# Patient Record
Sex: FEMALE | Race: BLACK OR AFRICAN AMERICAN | NOT HISPANIC OR LATINO | Employment: FULL TIME | URBAN - METROPOLITAN AREA
[De-identification: names, ages, dates, MRNs, and addresses within clinical notes are randomized per-mention and may not be internally consistent; named-entity substitution may affect disease eponyms.]

---

## 2022-10-21 ENCOUNTER — HOSPITAL ENCOUNTER (EMERGENCY)
Facility: CLINIC | Age: 52
Discharge: HOME | End: 2022-10-22
Attending: EMERGENCY MEDICINE | Admitting: EMERGENCY MEDICINE

## 2022-10-21 ENCOUNTER — APPOINTMENT (OUTPATIENT)
Dept: MRI IMAGING | Facility: CLINIC | Age: 52
End: 2022-10-21
Attending: EMERGENCY MEDICINE

## 2022-10-21 DIAGNOSIS — R90.89 ABNORMAL BRAIN MRI: ICD-10-CM

## 2022-10-21 DIAGNOSIS — R29.810 FACIAL DROOP: ICD-10-CM

## 2022-10-21 DIAGNOSIS — G51.0 BELL'S PALSY: ICD-10-CM

## 2022-10-21 LAB
ANION GAP SERPL CALCULATED.3IONS-SCNC: 10 MMOL/L (ref 5–18)
BASOPHILS # BLD AUTO: 0.1 10E3/UL (ref 0–0.2)
BASOPHILS NFR BLD AUTO: 1 %
BUN SERPL-MCNC: 10 MG/DL (ref 8–22)
CALCIUM SERPL-MCNC: 9.2 MG/DL (ref 8.5–10.5)
CHLORIDE BLD-SCNC: 108 MMOL/L (ref 98–107)
CO2 SERPL-SCNC: 22 MMOL/L (ref 22–31)
CREAT SERPL-MCNC: 0.69 MG/DL (ref 0.6–1.1)
EOSINOPHIL # BLD AUTO: 0.1 10E3/UL (ref 0–0.7)
EOSINOPHIL NFR BLD AUTO: 1 %
ERYTHROCYTE [DISTWIDTH] IN BLOOD BY AUTOMATED COUNT: 12.7 % (ref 10–15)
GFR SERPL CREATININE-BSD FRML MDRD: >90 ML/MIN/1.73M2
GLUCOSE BLD-MCNC: 94 MG/DL (ref 70–125)
HCT VFR BLD AUTO: 41.2 % (ref 35–47)
HGB BLD-MCNC: 13.6 G/DL (ref 11.7–15.7)
IMM GRANULOCYTES # BLD: 0 10E3/UL
IMM GRANULOCYTES NFR BLD: 0 %
LYMPHOCYTES # BLD AUTO: 2.5 10E3/UL (ref 0.8–5.3)
LYMPHOCYTES NFR BLD AUTO: 31 %
MCH RBC QN AUTO: 29.5 PG (ref 26.5–33)
MCHC RBC AUTO-ENTMCNC: 33 G/DL (ref 31.5–36.5)
MCV RBC AUTO: 89 FL (ref 78–100)
MONOCYTES # BLD AUTO: 0.6 10E3/UL (ref 0–1.3)
MONOCYTES NFR BLD AUTO: 7 %
NEUTROPHILS # BLD AUTO: 4.7 10E3/UL (ref 1.6–8.3)
NEUTROPHILS NFR BLD AUTO: 60 %
NRBC # BLD AUTO: 0 10E3/UL
NRBC BLD AUTO-RTO: 0 /100
PLATELET # BLD AUTO: 342 10E3/UL (ref 150–450)
POTASSIUM BLD-SCNC: 4 MMOL/L (ref 3.5–5)
RBC # BLD AUTO: 4.61 10E6/UL (ref 3.8–5.2)
SODIUM SERPL-SCNC: 140 MMOL/L (ref 136–145)
WBC # BLD AUTO: 7.9 10E3/UL (ref 4–11)

## 2022-10-21 PROCEDURE — 255N000002 HC RX 255 OP 636: Performed by: EMERGENCY MEDICINE

## 2022-10-21 PROCEDURE — 250N000011 HC RX IP 250 OP 636: Performed by: EMERGENCY MEDICINE

## 2022-10-21 PROCEDURE — 36415 COLL VENOUS BLD VENIPUNCTURE: CPT | Performed by: EMERGENCY MEDICINE

## 2022-10-21 PROCEDURE — 99285 EMERGENCY DEPT VISIT HI MDM: CPT | Mod: 25

## 2022-10-21 PROCEDURE — 96374 THER/PROPH/DIAG INJ IV PUSH: CPT | Mod: 59

## 2022-10-21 PROCEDURE — A9585 GADOBUTROL INJECTION: HCPCS | Performed by: EMERGENCY MEDICINE

## 2022-10-21 PROCEDURE — 85004 AUTOMATED DIFF WBC COUNT: CPT | Performed by: EMERGENCY MEDICINE

## 2022-10-21 PROCEDURE — 70553 MRI BRAIN STEM W/O & W/DYE: CPT

## 2022-10-21 PROCEDURE — 82435 ASSAY OF BLOOD CHLORIDE: CPT | Performed by: EMERGENCY MEDICINE

## 2022-10-21 RX ORDER — KETOROLAC TROMETHAMINE 15 MG/ML
15 INJECTION, SOLUTION INTRAMUSCULAR; INTRAVENOUS ONCE
Status: COMPLETED | OUTPATIENT
Start: 2022-10-21 | End: 2022-10-21

## 2022-10-21 RX ORDER — GADOBUTROL 604.72 MG/ML
7 INJECTION INTRAVENOUS ONCE
Status: COMPLETED | OUTPATIENT
Start: 2022-10-21 | End: 2022-10-21

## 2022-10-21 RX ADMIN — KETOROLAC TROMETHAMINE 15 MG: 15 INJECTION, SOLUTION INTRAMUSCULAR; INTRAVENOUS at 22:32

## 2022-10-21 RX ADMIN — GADOBUTROL 7 ML: 604.72 INJECTION INTRAVENOUS at 23:45

## 2022-10-21 ASSESSMENT — ACTIVITIES OF DAILY LIVING (ADL)
ADLS_ACUITY_SCORE: 35
ADLS_ACUITY_SCORE: 35

## 2022-10-22 VITALS
TEMPERATURE: 97.8 F | SYSTOLIC BLOOD PRESSURE: 161 MMHG | RESPIRATION RATE: 16 BRPM | OXYGEN SATURATION: 96 % | HEART RATE: 66 BPM | DIASTOLIC BLOOD PRESSURE: 85 MMHG

## 2022-10-22 RX ORDER — VALACYCLOVIR HYDROCHLORIDE 1 G/1
1000 TABLET, FILM COATED ORAL 3 TIMES DAILY
Qty: 21 TABLET | Refills: 0 | Status: SHIPPED | OUTPATIENT
Start: 2022-10-22 | End: 2022-10-29

## 2022-10-22 RX ORDER — PREDNISONE 20 MG/1
TABLET ORAL
Qty: 10 TABLET | Refills: 0 | Status: SHIPPED | OUTPATIENT
Start: 2022-10-22

## 2022-10-22 ASSESSMENT — ACTIVITIES OF DAILY LIVING (ADL): ADLS_ACUITY_SCORE: 35

## 2022-10-22 NOTE — ED TRIAGE NOTES
Triage Assessment     Row Name 10/21/22 2104       Triage Assessment (Adult)    Airway WDL WDL       Respiratory WDL    Respiratory WDL WDL       Skin Circulation/Temperature WDL    Skin Circulation/Temperature WDL WDL       Cardiac WDL    Cardiac WDL WDL       Peripheral/Neurovascular WDL    Peripheral Neurovascular WDL WDL       Cognitive/Neuro/Behavioral WDL    Cognitive/Neuro/Behavioral WDL WDL

## 2022-10-22 NOTE — ED PROVIDER NOTES
EMERGENCY DEPARTMENT ENCOUNTER      NAME: Alicia Rodriguez  AGE: 52 year old female  YOB: 1970  MRN: [id number]  EVALUATION DATE & TIME: 10/21/2022  9:03 PM    PCP: No primary care provider on file.    ED PROVIDER: Remy Cornell M.D.      Chief Complaint   Patient presents with     Facial Droop     Otalgia     Headache       FINAL IMPRESSION:  1. Facial droop    2. Bell's palsy    3. Abnormal brain MRI        ED COURSE & MEDICAL DECISION MAKIN year old female presents to the Emergency Department for evaluation of right-sided facial droop earache and headache.  She is hypertensive but otherwise vitally stable when she arrives to the emergency department.  She reports pain in the right occiput, otalgia, and today noticed a right-sided facial droop.  I evaluated the patient quickly in triage, she has incomplete right lower facial droop, minimal if any asymmetry of her right forehead however.  Otoscopic exam is unrevealing.  The remainder of her neurological exam is full and nonfocal except for reported subjective paresthesias of her upper extremities which have been ongoing and documented for some time.  I can see on chart review that she has had other visits for what was described as migraine headaches but has been sometime.  I am thinking at this point that her exam probably represents a partial facial paralysis from Bell's palsy however given the relative sparing of the forehead, did elect to obtain a brain MRI with and without contrast to exclude stroke.  This was negative for infarct.  There were a couple of abnormal findings that I did review with the patient.  The nonspecific T2/FLAIR signal intensities appear to be stable going back to previous brain imaging from 2018.  There is a new enhancing lesion on the posterior falx, probable meningioma, which the patient was updated about and will require surveillance but unlikely to be relating to today's presentation.  Finally there was  tortuous bilateral optic nerves and fluid-filled optic nerve sheaths.  Patient does not have any vision changes and has a relatively isolated and point intermittent headaches that I do not think are especially suspicious for intracranial hypertension.  No visible papilledema today however nondilated eye exam would be diagnostic.  Discussed everything with the patient and her family member, they declined an Czech  which was offered.  For now we will treat her as a partial Bell's palsy with antivirals and a short course of steroids.  I do think urgent neurology referral is warranted to discuss the other MRI findings and review her exam soon as possible and this was placed for them.  We discussed plan for discharge home and they were in agreement.  We reviewed return precautions for any new acute neurological deficit, vision change, intractable headache, or other immediate concern.    At the conclusion of the encounter I discussed the results of all of the tests and the disposition. The questions were answered. The patient or family acknowledged understanding and was agreeable with the care plan.       MEDICATIONS GIVEN IN THE EMERGENCY:  Medications   ketorolac (TORADOL) injection 15 mg (15 mg Intravenous Given 10/21/22 2232)   gadobutrol (GADAVIST) injection 7 mL (7 mLs Intravenous Given 10/21/22 2345)       NEW PRESCRIPTIONS STARTED AT TODAY'S ER VISIT  New Prescriptions    PREDNISONE (DELTASONE) 20 MG TABLET    Take two tablets (= 40mg) each day for 5 (five) days    VALACYCLOVIR (VALTREX) 1000 MG TABLET    Take 1 tablet (1,000 mg) by mouth 3 times daily for 7 days          =================================================================    HPI    Patient information was obtained from: Patient     Use of : N/A        Alicia Rodriguez is a 52 year old female with no contributory history who presents to this ED via private vehicle for evaluation of facial droop and headache.    Per chart review,  patient was seen at  [facility] earlier today on 10/21/22 for ear pain, headache, neck pain, and facial droop. Patient was diagnosed with an external ear infection and directed to go to the ED for further evaluation.    Patient reports right sided facial droop that she noticed around 10 AM today. Additionally, she endorses right sided ear pain, right sided neck pain, and a persisting headache. Patient notes that she has numbness in both of her hands at baseline. Patient denies loss of hearing or additional symptoms or complaints at this time.       REVIEW OF SYSTEMS   All systems reviewed and negative except as noted in HPI.    PAST MEDICAL HISTORY:  History reviewed. No pertinent past medical history.    PAST SURGICAL HISTORY:  History reviewed. No pertinent surgical history.        CURRENT MEDICATIONS:    No current facility-administered medications for this encounter.     Current Outpatient Medications   Medication     predniSONE (DELTASONE) 20 MG tablet     valACYclovir (VALTREX) 1000 mg tablet         ALLERGIES:  No Known Allergies    FAMILY HISTORY:  History reviewed. No pertinent family history.    SOCIAL HISTORY:   Social History     Socioeconomic History     Marital status:        VITALS:  BP (!) 151/85   Pulse 62   Temp 97.8  F (36.6  C) (Temporal)   Resp 16   LMP  (LMP Unknown)   SpO2 97%     PHYSICAL EXAM    Constitutional: Well developed, Well nourished, NAD.  HENT: Normocephalic, Atraumatic. Neck Supple.  Eyes: EOMI, Conjunctiva normal. Limited non dilated fundoscopic exam unremarkable.  Respiratory: Breathing comfortably on room air. Speaks full sentences easily. Lungs clear to ascultation.  Cardiovascular: Normal heart rate, Regular rhythm. No peripheral edema.  Abdomen: Soft  Musculoskeletal: Good range of motion in all major joints. No major deformities noted.  Integument: Warm, Dry.  Neurologic: Fully awake, alert, oriented.  There is incomplete paralysis of the right lower  face although the right forehead has minimal if any asymmetry..  Speech is normal.  Cranial nerves II through XII otherwise intact.  Strength is 5 out of 5 throughout bilateral upper and lower extremities.  Sensation to light touch intact x4.  Patient is ambulatory.  Psychiatric: Cooperative. Affect appropriate.     LAB:  All pertinent labs reviewed and interpreted.  Labs Ordered and Resulted from Time of ED Arrival to Time of ED Departure   BASIC METABOLIC PANEL - Abnormal       Result Value    Sodium 140      Potassium 4.0      Chloride 108 (*)     Carbon Dioxide (CO2) 22      Anion Gap 10      Urea Nitrogen 10      Creatinine 0.69      Calcium 9.2      Glucose 94      GFR Estimate >90     CBC WITH PLATELETS AND DIFFERENTIAL    WBC Count 7.9      RBC Count 4.61      Hemoglobin 13.6      Hematocrit 41.2      MCV 89      MCH 29.5      MCHC 33.0      RDW 12.7      Platelet Count 342      % Neutrophils 60      % Lymphocytes 31      % Monocytes 7      % Eosinophils 1      % Basophils 1      % Immature Granulocytes 0      NRBCs per 100 WBC 0      Absolute Neutrophils 4.7      Absolute Lymphocytes 2.5      Absolute Monocytes 0.6      Absolute Eosinophils 0.1      Absolute Basophils 0.1      Absolute Immature Granulocytes 0.0      Absolute NRBCs 0.0         RADIOLOGY:  Reviewed all pertinent imaging. Please see official radiology report.  MR Brain w/o & w Contrast   Preliminary Result   IMPRESSION:   1.  No acute or subacute infarct identified.   2.  Small enhancing lesion along the left aspect of the posterior falx measuring up to 4 mm in greatest dimensions. There is no significant associated mass effect or vasogenic edema. This finding likely reflects a meningioma. Continued interval follow-up    imaging is recommended in 3-6 month.   3.  There are scattered foci of increased T2/FLAIR signal hyperintensity involving the periventricular, subcortical and deep white matter of the supratentorial brain, nonspecific. These  findings are somewhat advanced for patient age and may reflect    chronic microvascular ischemic change, chronic migraine effect, a demyelinating process (although no enhancing lesions are identified to suggest active demyelination), or even Lyme disease, among other etiologies.   4.  Prominent Meckel's cave with tortuous bilateral optic nerves with prominent fluid-filled optic nerve sheaths. These findings have been reported in the setting of idiopathic intracranial hypertension. No definite other stigmata of intracranial    hypertension are identified. Correlation for papilledema and chronic headaches is recommended.            I, Terry Arguello, am serving as a scribe to document services personally performed by Dr. Remy Cornell, based on my observation and the provider's statements to me. I, Remy Cornell MD attest that Terry Arguello is acting in a scribe capacity, has observed my performance of the services and has documented them in accordance with my direction.    Remy Cornell M.D.  Emergency Medicine  [facility] EMERGENCY ROOM  [address] [id number] [phone]  Dept: [phone]     Remy Cornell MD  10/22/22 0207

## 2022-10-22 NOTE — DISCHARGE INSTRUCTIONS
You were seen in the emergency department at [facility] for right-sided facial droop and headache.  We think your symptoms may be consistent with a process called Bell's palsy which is a problem with the facial nerve on the right side causing paralysis of half of your face.  The usual treatment for this includes antiviral medications and steroids to complete a short course.  Since you are also having a headache and your exam seems a little bit abnormal, we did obtain a brain MRI.  There were some chronic stable findings from previous however there were a couple of things that we would like you to review with a neurologist.  1 of these was optic nerve thickening, and the other was a small incidental benign growth that we think is probably a meningioma.  There is a good chance they will want to repeat some brain imaging in about 3 to 6 months to follow this up.  We placed a neurology referral and a  should be reaching out to you to help make an appointment.  If you do not hear anything you can also call the clinic next week using the information above.  If you have any other immediate concerns like new neurological deficits, vision changes, weakness or inability to walk, we need to reevaluate you right away in the emergency department.

## 2022-10-22 NOTE — ED TRIAGE NOTES
Pt arrives to ED with c/o right facial droop she noticed today around 1000 this morning. Pt also has right ear pain, right neck pain, and a headache. Pt always has numbness on both hands which is her norm. Provider at bedside to stroke assess.

## 2022-12-15 NOTE — TELEPHONE ENCOUNTER
RECORDS RECEIVED FROM: internal   REASON FOR VISIT: facial droop   Date of Appt: 3/14/23   NOTES (FOR ALL VISITS) STATUS DETAILS   OFFICE NOTE from referring provider Internal SEE INPATIENT NOTES   DISCHAR[facility] the [facility]:  10/21/22-10/22/22   MEDICATION LIST Internal    IMAGING  (FOR ALL VISITS)     MRI (HEAD, NECK, SPINE) Internal [facility]:  MRI Brain 10/21/22

## 2023-03-13 ENCOUNTER — TELEPHONE (OUTPATIENT)
Dept: NEUROLOGY | Facility: CLINIC | Age: 53
End: 2023-03-13

## 2023-03-13 NOTE — TELEPHONE ENCOUNTER
Spoke with patient's brother, he stated patient has seen another provider and no longer needs this appointment. Cancelled and did not reschedule.    Gopal Hercules on 3/13/2023 at 4:36 PM

## 2023-03-13 NOTE — TELEPHONE ENCOUNTER
"----- Message from Zara Ortiz RN sent at 3/13/2023  9:00 AM CDT -----  Regarding: FW: RS Appt for tomorrow, 3/14/23    ----- Message -----  From: Bhumika Cartwright RN  Sent: 3/13/2023   8:58 AM CDT  To: Taylor Severson, Jessica Moreno, CMA, #  Subject: RS Appt for tomorrow, 3/14/23                    Hi Clinic Coordinators,    Please contact patient to reschedule with general neurology. She is scheduled inappropriately with stroke.    After this patient is rescheduled please contact the following wait list patients, in the order listed, to schedule VIDEO visit in that slot.      [phone]   [phone]   [phone]    Zara and Tahmina, do you have any VALDO slots you could slip this patient into given she has waited so long?    Bhumika Judd    ----- Message -----  From: Elizabeth Dowd MD  Sent: 3/10/2023   4:28 PM CDT  To: Taylor Severson, Jessica Moreno, CMA, #  Subject: RE: Inappropriately Scheduled? (3/14)            Yes - this is a patient for general neurology      ----- Message -----  From: Bhumika Cartwright RN  Sent: 3/10/2023   2:15 PM CST  To: Taylor Severson, Kamakshi Lakshminarayan, MD, #  Subject: Inappropriately Scheduled? (3/14)                Hi Dr. Dowd,    I believe this patient was scheduled incorrectly. The \"urgent\" referral was placed to GENERAL NEUROLOGY in 10/2022 by Remy Cornell MD for diagnosis: facial droop, bells palsy, abnormal MRI.     Please confirm so we can get patient rescheduled to appropriate provider, if needed.     Bhumika Judd      MRI 10/21/22 IMPRESSION:  1.  No acute or subacute infarct identified.  2.  Small enhancing lesion along the left aspect of the posterior falx measuring up to 4 mm in greatest dimensions. There is no significant associated mass effect or vasogenic edema. This finding likely reflects a meningioma. Continued interval follow-up   imaging is recommended in 3-6 month.  3.  There are scattered foci of increased " T2/FLAIR signal hyperintensity involving the periventricular, subcortical and deep white matter of the supratentorial brain, nonspecific. These findings are somewhat advanced for patient age and may reflect   chronic microvascular ischemic change, chronic migraine effect, a demyelinating process (although no enhancing lesions are identified to suggest active demyelination), or even Lyme disease, among other etiologies.  4.  Prominent Meckel's cave and tortuous bilateral optic nerves with prominent fluid-filled optic nerve sheaths. These findings have been reported in the setting of idiopathic intracranial hypertension. No definite other stigmata of intracranial   hypertension are identified. Correlation for papilledema and chronic headaches is recommended.

## 2023-03-13 NOTE — TELEPHONE ENCOUNTER
"LVM on brothers line, wasn't sure if the primary number was correct. LVM for pt to call and schedule ..\".NGN 4/3 at 1:00 in person with Dr Grubbs at [facility].  I held the spot in case this works. \" per Zara Seeb      3/13/23 BD     "

## 2023-03-14 ENCOUNTER — PRE VISIT (OUTPATIENT)
Dept: NEUROLOGY | Facility: CLINIC | Age: 53
End: 2023-03-14